# Patient Record
Sex: MALE | Race: WHITE | NOT HISPANIC OR LATINO | Employment: OTHER | URBAN - METROPOLITAN AREA
[De-identification: names, ages, dates, MRNs, and addresses within clinical notes are randomized per-mention and may not be internally consistent; named-entity substitution may affect disease eponyms.]

---

## 2022-11-02 ENCOUNTER — TELEPHONE (OUTPATIENT)
Dept: NEUROLOGY | Facility: CLINIC | Age: 47
End: 2022-11-02

## 2022-11-02 NOTE — TELEPHONE ENCOUNTER
[facility] Call Center    Phone Message    May a detailed message be left on voicemail: yes     Reason for Call: Other: Pt called inquiring on making an appt with Dr. Pedroza.  Writer informed Pt that he needed a referral per new protocols, but states he is a previous Pt of Dr. Pedroza from [facility].    Please call Pt back at [phone] to discuss further.    Action Taken: Message routed to:  [facility]    Travel Screening: Not Applicable

## 2022-11-14 ENCOUNTER — OFFICE VISIT (OUTPATIENT)
Dept: NEUROLOGY | Facility: CLINIC | Age: 47
End: 2022-11-14

## 2022-11-14 ENCOUNTER — LAB (OUTPATIENT)
Dept: LAB | Facility: CLINIC | Age: 47
End: 2022-11-14

## 2022-11-14 VITALS — DIASTOLIC BLOOD PRESSURE: 107 MMHG | HEART RATE: 81 BPM | SYSTOLIC BLOOD PRESSURE: 153 MMHG

## 2022-11-14 DIAGNOSIS — E78.2 MIXED HYPERLIPIDEMIA: ICD-10-CM

## 2022-11-14 DIAGNOSIS — M79.2 NEUROPATHIC PAIN: ICD-10-CM

## 2022-11-14 DIAGNOSIS — M79.2 NEUROPATHIC PAIN: Primary | ICD-10-CM

## 2022-11-14 DIAGNOSIS — R29.818 FINE MOTOR IMPAIRMENT: ICD-10-CM

## 2022-11-14 DIAGNOSIS — R29.898 FINE MOTOR IMPAIRMENT: ICD-10-CM

## 2022-11-14 DIAGNOSIS — F10.288 ALCOHOL DEPENDENCE WITH OTHER ALCOHOL-INDUCED DISORDER (H): ICD-10-CM

## 2022-11-14 DIAGNOSIS — G25.81 RESTLESS LEGS SYNDROME (RLS): ICD-10-CM

## 2022-11-14 LAB
CHOLEST SERPL-MCNC: 135 MG/DL
FASTING STATUS PATIENT QL REPORTED: ABNORMAL
HDLC SERPL-MCNC: 33 MG/DL
LDLC SERPL CALC-MCNC: 83 MG/DL
TRIGL SERPL-MCNC: 97 MG/DL

## 2022-11-14 PROCEDURE — 99215 OFFICE O/P EST HI 40 MIN: CPT | Performed by: PSYCHIATRY & NEUROLOGY

## 2022-11-14 PROCEDURE — 82746 ASSAY OF FOLIC ACID SERUM: CPT

## 2022-11-14 PROCEDURE — 80061 LIPID PANEL: CPT

## 2022-11-14 PROCEDURE — 82607 VITAMIN B-12: CPT

## 2022-11-14 PROCEDURE — 36415 COLL VENOUS BLD VENIPUNCTURE: CPT

## 2022-11-14 RX ORDER — PRAMIPEXOLE DIHYDROCHLORIDE 0.25 MG/1
0.25 TABLET ORAL 2 TIMES DAILY
COMMUNITY
Start: 2022-05-23 | End: 2022-11-14

## 2022-11-14 RX ORDER — PRAMIPEXOLE DIHYDROCHLORIDE 0.25 MG/1
0.25 TABLET ORAL 2 TIMES DAILY
Qty: 180 TABLET | Refills: 3 | Status: SHIPPED | OUTPATIENT
Start: 2022-11-14 | End: 2023-11-13

## 2022-11-14 RX ORDER — ATORVASTATIN CALCIUM 20 MG/1
20 TABLET, FILM COATED ORAL EVERY EVENING
COMMUNITY
Start: 2022-05-23 | End: 2022-11-14

## 2022-11-14 RX ORDER — ATORVASTATIN CALCIUM 20 MG/1
20 TABLET, FILM COATED ORAL EVERY EVENING
Qty: 90 TABLET | Refills: 0 | Status: SHIPPED | OUTPATIENT
Start: 2022-11-14 | End: 2024-04-05

## 2022-11-14 RX ORDER — GABAPENTIN 400 MG/1
CAPSULE ORAL
Qty: 360 CAPSULE | Refills: 3 | Status: SHIPPED | OUTPATIENT
Start: 2022-11-14 | End: 2023-11-13

## 2022-11-14 RX ORDER — GABAPENTIN 400 MG/1
1 CAPSULE ORAL 3 TIMES DAILY
COMMUNITY
Start: 2021-11-19 | End: 2022-11-14

## 2022-11-14 NOTE — PROGRESS NOTES
Date of Service: 11/14/2022    [facility] Neurology   MS Clinic Visit    Subjective: 47-year-old man with a history of alcohol dependence, hyperlipidemia, who presents in follow-up for Guillain-Barré syndrome.  He also has restless leg syndrome.    He is accompanied by his wife, Kaycee, but provides history independently.    I last visited him a little over 1 year ago.  At that time he was noticing gradual improvement of symptoms.    Today he reports that he continues to slowly improve, but is concerned as improvement has been incomplete.  He notes that he has met number of people who have struggled with Guillain-Barré.  Their recovery stories have each been different.    He does notice some improvement in fine motor skills.  He is able to do a clasp for his dog leash, but has difficulty with other tasks such as buttoning a shirt.  His writing skills have improved.  With repetitive use of the hands he will experience a stiff, swollen sensation in the hands.  He does not notice any impact of gabapentin on the sensation.  If he does miss a dose of gabapentin he will start to experience sharp pains in the feet.    His gait continues to gradually improve.  He does have some difficulty when going up stairs such that he needs to hold onto the railing.  His gait is predominantly limited by right knee pain for which a total knee arthroplasty has been advised.  He has not had any falls in the past several months.  He was recently able to walk for 1-1/2 hours carrying a 30 pound pack on his back.  He is able to stand and watch a hockey game for at least 1 hour.    He has not been taking folic acid routinely as he ran out of the medication a few months ago.    He does continue to consume alcohol on a routine basis.  He has anywhere from 6-9 shots of vodka per day.  He has cut out soda consumption.  He notices weight gain in the past 2 to 3 months.    No Known Allergies    Current Outpatient Medications   Medication      atorvastatin (LIPITOR) 20 MG tablet     gabapentin (NEURONTIN) 400 MG capsule     pramipexole (MIRAPEX) 0.25 MG tablet     No current facility-administered medications for this visit.        Past medical, surgical, social and family history was personally reviewed. Pertinent details noted above.     Physical Examination:   BP (!) 153/107 (BP Location: Right arm, Patient Position: Sitting)   Pulse 81     General: no acute distress  Cranial nerves:   VFFC  PER b/l  EOM full w/no NATE   Face symmetric  Hearing intact  No dysarthria   Motor:   Tone is normal   Bulk is normal   Dexterity reduced in the left hand    R L  Finger ext 5 5  Finger abd 5 5    Hip flexion 5 5  Knee flexion 5 5  Knee ext 5 5  Ankle d/f 5 5    Reflexes: 1+ and symmetric throughout, babinski absent bilaterally  Sensory: vibration is moderately reduced in the toes, mildly reduced in the ankles, JPS moderately reduced in the right toe, normal on the left   Romberg is absent  Coordination: no ataxia or dysmetria  Gait: normal base and stride, tandem gait is mildly impaired, able to balance on one foot for 5-7 seconds    Tests/Imaging:   MRI brain   5/2021 - left frontal operculum stroke    Assessment: 47-year-old man with a history of alcohol dependence who presented with subacute gait instability and appendicular ataxia.  He was ultimately diagnosed with Guillain-Barré.  He has experienced some improvement in function, but does have some residual fine motor impairment.    We discussed how incomplete recovery is likely secondary to ongoing alcohol use.  It was emphasized multiple times that alcohol is a toxin and does interfere with nerve recovery.    We did discuss consequences of ongoing alcohol use including impaired short-term memory, impaired balance, and neuropathic pain.  He was encouraged to discontinue alcohol use altogether, though it was also noted that the American Heart Association recommends no more than 2 alcoholic beverages for men  per day.  This would equal two ounces of hard liquor.    For secondary stroke prevention atorvastatin will be renewed, though he was encouraged to follow-up with primary care.  Blood pressure will be reassessed, though we did discuss how this elevation in blood pressure is likely related to ongoing alcohol use.    Plan:   -Occupational therapy for fine motor skills  - Blood work today to assess for vitamin deficiency and cholesterol  - Gabapentin 400 mg 3 times per day, additional dose daily as needed for activities  - Mirapex 0.25 mg twice per day for restless legs  - Follow-up in 1 year    Note was completed with the assistance of Dragon Fluency software which can often result in accidental word substitutions.     A total of 40 minutes on the date of service were spent in the care of this patient.   Lori Pedroza MD on 11/14/2022 at 2:32 PM

## 2022-11-14 NOTE — LETTER
11/14/2022         RE: Prashant Rucker  [address], [place] [address]        Dear Colleague,    Thank you for referring your patient, Prashant Rucker, to the [facility]. Please see a copy of my visit note below.    Date of Service: 11/14/2022    [facility] Neurology   MS Clinic Visit    Subjective: 47-year-old man with a history of alcohol dependence, hyperlipidemia, who presents in follow-up for Guillain-Barré syndrome.  He also has restless leg syndrome.    He is accompanied by his wife, Kaycee, but provides history independently.    I last visited him a little over 1 year ago.  At that time he was noticing gradual improvement of symptoms.    Today he reports that he continues to slowly improve, but is concerned as improvement has been incomplete.  He notes that he has met number of people who have struggled with Guillain-Barré.  Their recovery stories have each been different.    He does notice some improvement in fine motor skills.  He is able to do a clasp for his dog leash, but has difficulty with other tasks such as buttoning a shirt.  His writing skills have improved.  With repetitive use of the hands he will experience a stiff, swollen sensation in the hands.  He does not notice any impact of gabapentin on the sensation.  If he does miss a dose of gabapentin he will start to experience sharp pains in the feet.    His gait continues to gradually improve.  He does have some difficulty when going up stairs such that he needs to hold onto the railing.  His gait is predominantly limited by right knee pain for which a total knee arthroplasty has been advised.  He has not had any falls in the past several months.  He was recently able to walk for 1-1/2 hours carrying a 30 pound pack on his back.  He is able to stand and watch a hockey game for at least 1 hour.    He has not been taking folic acid routinely as he ran out of the medication a few months ago.    He does  continue to consume alcohol on a routine basis.  He has anywhere from 6-9 shots of vodka per day.  He has cut out soda consumption.  He notices weight gain in the past 2 to 3 months.    No Known Allergies    Current Outpatient Medications   Medication     atorvastatin (LIPITOR) 20 MG tablet     gabapentin (NEURONTIN) 400 MG capsule     pramipexole (MIRAPEX) 0.25 MG tablet     No current facility-administered medications for this visit.        Past medical, surgical, social and family history was personally reviewed. Pertinent details noted above.     Physical Examination:   BP (!) 153/107 (BP Location: Right arm, Patient Position: Sitting)   Pulse 81     General: no acute distress  Cranial nerves:   VFFC  PER b/l  EOM full w/no NATE   Face symmetric  Hearing intact  No dysarthria   Motor:   Tone is normal   Bulk is normal   Dexterity reduced in the left hand    R L  Finger ext 5 5  Finger abd 5 5    Hip flexion 5 5  Knee flexion 5 5  Knee ext 5 5  Ankle d/f 5 5    Reflexes: 1+ and symmetric throughout, babinski absent bilaterally  Sensory: vibration is moderately reduced in the toes, mildly reduced in the ankles, JPS moderately reduced in the right toe, normal on the left   Romberg is absent  Coordination: no ataxia or dysmetria  Gait: normal base and stride, tandem gait is mildly impaired, able to balance on one foot for 5-7 seconds    Tests/Imaging:   MRI brain   5/2021 - left frontal operculum stroke    Assessment: 47-year-old man with a history of alcohol dependence who presented with subacute gait instability and appendicular ataxia.  He was ultimately diagnosed with Guillain-Barré.  He has experienced some improvement in function, but does have some residual fine motor impairment.    We discussed how incomplete recovery is likely secondary to ongoing alcohol use.  It was emphasized multiple times that alcohol is a toxin and does interfere with nerve recovery.    We did discuss consequences of ongoing alcohol  use including impaired short-term memory, impaired balance, and neuropathic pain.  He was encouraged to discontinue alcohol use altogether, though it was also noted that the American Heart Association recommends no more than 2 alcoholic beverages for men per day.  This would equal two ounces of hard liquor.    For secondary stroke prevention atorvastatin will be renewed, though he was encouraged to follow-up with primary care.  Blood pressure will be reassessed, though we did discuss how this elevation in blood pressure is likely related to ongoing alcohol use.    Plan:   -Occupational therapy for fine motor skills  - Blood work today to assess for vitamin deficiency and cholesterol  - Gabapentin 400 mg 3 times per day, additional dose daily as needed for activities  - Mirapex 0.25 mg twice per day for restless legs  - Follow-up in 1 year    Note was completed with the assistance of Dragon Fluency software which can often result in accidental word substitutions.     A total of 40 minutes on the date of service were spent in the care of this patient.   Lori Pedroza MD on 11/14/2022 at 2:32 PM          Again, thank you for allowing me to participate in the care of your patient.        Sincerely,        Lori Pedroza MD

## 2022-11-14 NOTE — PATIENT INSTRUCTIONS
Blood work today     We will recheck your blood pressure before you go     Alcohol - max 2 oz hard liquor per day, ideal is none  Risk of continuing is worsened memory, balaqnce, and [id number] to control nerve pain     Hand discomfort - can try taking 1 extra gabapentin daily as needed 30-60 minutes before increased activity     Visit with OT for fine motor skills    Follow up in 1 year

## 2022-11-14 NOTE — NURSING NOTE
Chief Complaint   Patient presents with     MS     Previous pt at        JERICHO Guevara on 11/14/2022 at 1:36 PM

## 2022-11-15 LAB
FOLATE SERPL-MCNC: 3.2 NG/ML (ref 4.6–34.8)
VIT B12 SERPL-MCNC: 339 PG/ML (ref 232–1245)

## 2022-11-16 ENCOUNTER — TELEPHONE (OUTPATIENT)
Dept: NEUROLOGY | Facility: CLINIC | Age: 47
End: 2022-11-16

## 2022-11-16 RX ORDER — FOLIC ACID 1 MG/1
1 TABLET ORAL DAILY
Qty: 90 TABLET | Refills: 3 | Status: SHIPPED | OUTPATIENT
Start: 2022-11-16

## 2022-11-16 NOTE — TELEPHONE ENCOUNTER
[facility] Call Center    Phone Message    May a detailed message be left on voicemail: no     Reason for Call: Other: Patient called back due to a missed call. Writer did inform of the results in this string and that a prescription has been called to his pharmacy.     Action Taken: Message routed to:  Other: [facility] NEUROLOGY    Travel Screening: Not Applicable

## 2022-11-16 NOTE — RESULT ENCOUNTER NOTE
Please notify patient that blood work reveals his cholesterol is controlled with the stating. B12 level is normal. Folate level is low. He needs to take a supplement again. Folate is necessary for nerve health. Lori Pedroza MD on 11/16/2022 at 8:22 AM

## 2022-11-16 NOTE — TELEPHONE ENCOUNTER
----- Message from Lori Pedroza MD sent at 11/16/2022  8:22 AM CST -----  Please notify patient that blood work reveals his cholesterol is controlled with the stating. B12 level is normal. Folate level is low. He needs to take a supplement again. Folate is necessary for nerve health. Lori Pedroza MD on 11/16/202 2 at 8:22 AM

## 2023-11-13 ENCOUNTER — LAB (OUTPATIENT)
Dept: LAB | Facility: CLINIC | Age: 48
End: 2023-11-13

## 2023-11-13 ENCOUNTER — OFFICE VISIT (OUTPATIENT)
Dept: NEUROLOGY | Facility: CLINIC | Age: 48
End: 2023-11-13

## 2023-11-13 VITALS — DIASTOLIC BLOOD PRESSURE: 96 MMHG | SYSTOLIC BLOOD PRESSURE: 140 MMHG | HEART RATE: 98 BPM

## 2023-11-13 DIAGNOSIS — E53.8 FOLATE DEFICIENCY: ICD-10-CM

## 2023-11-13 DIAGNOSIS — R29.898 FINE MOTOR IMPAIRMENT: ICD-10-CM

## 2023-11-13 DIAGNOSIS — I10 PRIMARY HYPERTENSION: ICD-10-CM

## 2023-11-13 DIAGNOSIS — M79.2 NEUROPATHIC PAIN: ICD-10-CM

## 2023-11-13 DIAGNOSIS — M79.2 NEUROPATHIC PAIN: Primary | ICD-10-CM

## 2023-11-13 DIAGNOSIS — R29.818 FINE MOTOR IMPAIRMENT: ICD-10-CM

## 2023-11-13 DIAGNOSIS — G25.81 RESTLESS LEGS SYNDROME (RLS): ICD-10-CM

## 2023-11-13 LAB — FOLATE SERPL-MCNC: 23.2 NG/ML (ref 4.6–34.8)

## 2023-11-13 PROCEDURE — 36415 COLL VENOUS BLD VENIPUNCTURE: CPT

## 2023-11-13 PROCEDURE — 82746 ASSAY OF FOLIC ACID SERUM: CPT

## 2023-11-13 PROCEDURE — 99214 OFFICE O/P EST MOD 30 MIN: CPT | Performed by: PSYCHIATRY & NEUROLOGY

## 2023-11-13 PROCEDURE — 82607 VITAMIN B-12: CPT

## 2023-11-13 RX ORDER — IBUPROFEN 200 MG
400 TABLET ORAL
COMMUNITY
Start: 2021-06-30

## 2023-11-13 RX ORDER — GABAPENTIN 400 MG/1
CAPSULE ORAL
Qty: 360 CAPSULE | Refills: 3 | Status: SHIPPED | OUTPATIENT
Start: 2023-11-13

## 2023-11-13 RX ORDER — PRAMIPEXOLE DIHYDROCHLORIDE 0.25 MG/1
0.25 TABLET ORAL 2 TIMES DAILY
Qty: 180 TABLET | Refills: 3 | Status: SHIPPED | OUTPATIENT
Start: 2023-11-13

## 2023-11-13 RX ORDER — METOPROLOL TARTRATE 25 MG/1
25 TABLET, FILM COATED ORAL 2 TIMES DAILY
Qty: 60 TABLET | Refills: 2 | Status: SHIPPED | OUTPATIENT
Start: 2023-11-13 | End: 2024-04-05

## 2023-11-13 NOTE — LETTER
11/13/2023         RE: Prashant uRcker  [address] [address]        Dear Colleague,    Thank you for referring your patient, Prashant Rucker, to the [facility]OLOG[facility]. Please see a copy of my visit note below.    Date of Service: 11/13/2023    [facility] Neurology   MS Clinic Visit    Subjective: 48-year-old man with a history of alcohol dependence, hyperlipidemia, who presents in follow-up for Guillain-Barré syndrome.  He also has restless leg syndrome.    He is accompanied by his wife, Kaycee, but provides history independently.  He does not report any significant decline since his last visit with me.    He is happy to report that he was able to play golf over the summer months.  If he played golf multiple days in a row he would start to notice some fatigue, but otherwise he was able to tolerate the activity fairly well.  Just the past couple days he was able to do some walking in the woods as he is preparing for hunting.  Balance is improved.  He has not had any falls in the past year.    He does have some residual numbness in his fingertips.  With this he notices some impairment of fine motor control, but does note that he is able to do some things better than he could in the past.    He continues to take gabapentin.  He did try going without this for a day, but did experience significant symptoms as he tried to fall asleep.  He continues to take 400 mg 3 times per day and will take 1 extra tablet on days where he is more physically active.    He does report that his blood pressure is elevated today.  He is interested in resuming an antihypertensive medication that he has taken in the past.    He is taking his folic acid supplements.    Pramipexole continues to adequately suppress restless legs    No Known Allergies    Current Outpatient Medications   Medication     aspirin (ASA) 81 MG EC tablet     folic acid (FOLVITE) 1 MG tablet     gabapentin (NEURONTIN)  400 MG capsule     ibuprofen (ADVIL/MOTRIN) 200 MG tablet     pramipexole (MIRAPEX) 0.25 MG tablet     atorvastatin (LIPITOR) 20 MG tablet     No current facility-administered medications for this visit.        Past medical, surgical, social and family history was personally reviewed. Pertinent details noted above.     Physical Examination:   BP (!) 140/96 (BP Location: Right arm, Patient Position: Sitting)   Pulse 98     General: no acute distress  Cranial nerves:   VFFC  PER b/l  EOM full w/no NATE   Face symmetric  Hearing intact  No dysarthria   Motor:   Tone is normal   Bulk is normal   Dexterity reduced in the left hand    R L  Deltoid  5 5  Biceps  5 5  Triceps  5 5  Finger ext 5 5  Finger abd 5 5    Hip flexion 5 5-  Knee flexion 5 5  Knee ext 5 5  Ankle d/f 5 5    Reflexes: 1+ and symmetric throughout, no ankle clonus  Sensory: vibration is normal in the ankles  Romberg is absent  Coordination: no ataxia or dysmetria  Gait: normal base and stride, tandem gait is mildly impaired, able to balance on right foot for 5-7 seconds, struggles with the left foot     Tests/Imaging:   Folate 3.2  B12 300s    MRI brain   5/2021 - left frontal operculum stroke    Assessment: 48-year-old man with a history of alcohol dependence who presented with subacute gait instability and appendicular ataxia.  He was ultimately diagnosed with Guillain-Barré.  Examination today is remarkable for an improvement in sensation in the lower extremities, though the left side is noted to be slightly more impaired compared to the right.    We discussed adjustments that could be made for his gabapentin.    We will continue with Mirapex for management of restless legs.    I would like to check his folic acid and B12 levels to ensure adequate replacement.    I did order for him to resume taking metoprolol, though strongly encouraged him to initiate care with a new primary care provider.    Plan:   -Gabapentin 400 mg in the morning, 800 mg at  bedtime, daily dose as needed  - Mirapex 0.25 mg twice per day  - Folic acid and B12 levels today  - Metoprolol 5 provided for 3 months  - Follow-up in 1 year/as needed    Note was completed with the assistance of Dragon Fluency software which can often result in accidental word substitutions.     A total of 30 minutes on the date of service were spent in the care of this patient.   Lori Pedroza MD on 11/14/2022 at 2:32 PM        Again, thank you for allowing me to participate in the care of your patient.        Sincerely,        Lori Pedroza MD

## 2023-11-13 NOTE — PATIENT INSTRUCTIONS
Gabapentin   Try 1 in the morning and two at night (you can take at dinner time so that the level is up by the time you go to bed)   Take 1 daily as needed  Can reduce by 1 tablet per day each week if you would like     Continue pramipexole     Metoprolol ordered for you to take until you see primary care     [facility] into [facility]     Blood work today     Follow up in 1 year

## 2023-11-13 NOTE — NURSING NOTE
Chief Complaint   Patient presents with    Follow Up       JERICHO Guevara on 11/13/2023 at 1:47 PM

## 2023-11-13 NOTE — PROGRESS NOTES
Date of Service: 11/13/2023    [facility] Neurology   MS Clinic Visit    Subjective: 48-year-old man with a history of alcohol dependence, hyperlipidemia, who presents in follow-up for Guillain-Barré syndrome.  He also has restless leg syndrome.    He is accompanied by his wife, Kaycee, but provides history independently.  He does not report any significant decline since his last visit with me.    He is happy to report that he was able to play golf over the summer months.  If he played golf multiple days in a row he would start to notice some fatigue, but otherwise he was able to tolerate the activity fairly well.  Just the past couple days he was able to do some walking in the woods as he is preparing for hunting.  Balance is improved.  He has not had any falls in the past year.    He does have some residual numbness in his fingertips.  With this he notices some impairment of fine motor control, but does note that he is able to do some things better than he could in the past.    He continues to take gabapentin.  He did try going without this for a day, but did experience significant symptoms as he tried to fall asleep.  He continues to take 400 mg 3 times per day and will take 1 extra tablet on days where he is more physically active.    He does report that his blood pressure is elevated today.  He is interested in resuming an antihypertensive medication that he has taken in the past.    He is taking his folic acid supplements.    Pramipexole continues to adequately suppress restless legs    No Known Allergies    Current Outpatient Medications   Medication    aspirin (ASA) 81 MG EC tablet    folic acid (FOLVITE) 1 MG tablet    gabapentin (NEURONTIN) 400 MG capsule    ibuprofen (ADVIL/MOTRIN) 200 MG tablet    pramipexole (MIRAPEX) 0.25 MG tablet    atorvastatin (LIPITOR) 20 MG tablet     No current facility-administered medications for this visit.        Past medical, surgical, social and family history was personally  reviewed. Pertinent details noted above.     Physical Examination:   BP (!) 140/96 (BP Location: Right arm, Patient Position: Sitting)   Pulse 98     General: no acute distress  Cranial nerves:   VFFC  PER b/l  EOM full w/no NATE   Face symmetric  Hearing intact  No dysarthria   Motor:   Tone is normal   Bulk is normal   Dexterity reduced in the left hand    R L  Deltoid  5 5  Biceps  5 5  Triceps  5 5  Finger ext 5 5  Finger abd 5 5    Hip flexion 5 5-  Knee flexion 5 5  Knee ext 5 5  Ankle d/f 5 5    Reflexes: 1+ and symmetric throughout, no ankle clonus  Sensory: vibration is normal in the ankles  Romberg is absent  Coordination: no ataxia or dysmetria  Gait: normal base and stride, tandem gait is mildly impaired, able to balance on right foot for 5-7 seconds, struggles with the left foot     Tests/Imaging:   Folate 3.2  B12 300s    MRI brain   5/2021 - left frontal operculum stroke    Assessment: 48-year-old man with a history of alcohol dependence who presented with subacute gait instability and appendicular ataxia.  He was ultimately diagnosed with Guillain-Barré.  Examination today is remarkable for an improvement in sensation in the lower extremities, though the left side is noted to be slightly more impaired compared to the right.    We discussed adjustments that could be made for his gabapentin.    We will continue with Mirapex for management of restless legs.    I would like to check his folic acid and B12 levels to ensure adequate replacement.    I did order for him to resume taking metoprolol, though strongly encouraged him to initiate care with a new primary care provider.    Plan:   -Gabapentin 400 mg in the morning, 800 mg at bedtime, daily dose as needed  - Mirapex 0.25 mg twice per day  - Folic acid and B12 levels today  - Metoprolol 5 provided for 3 months  - Follow-up in 1 year/as needed    Note was completed with the assistance of Dragon Fluency software which can often result in accidental word  substitutions.     A total of 30 minutes on the date of service were spent in the care of this patient.   Lori Pedroza MD on 11/14/2022 at 2:32 PM

## 2023-11-14 LAB — VIT B12 SERPL-MCNC: 384 PG/ML (ref 232–1245)

## 2023-11-14 NOTE — RESULT ENCOUNTER NOTE
Please notify pt that b12 and folate level are normal. He should continue his supplement (folate) Lori Pedroza MD on 11/14/2023 at 10:30 AM

## 2023-11-15 ENCOUNTER — TELEPHONE (OUTPATIENT)
Dept: NEUROLOGY | Facility: CLINIC | Age: 48
End: 2023-11-15

## 2023-11-15 NOTE — TELEPHONE ENCOUNTER
----- Message from Aure Youssef RN sent at 11/15/2023  9:56 AM CST -----    ----- Message -----  From: Lori Pedroza MD  Sent: 11/14/2023  10:31 AM CST  To: Multiple Sclerosis Nursing Pool    Please notify pt that b12 and folate level are normal. He should continue his supplement (folate) Lori Pedroza MD on 11/14/2023 at 10:30 AM

## 2023-11-15 NOTE — TELEPHONE ENCOUNTER
Called and left a detailed message regarding message below (consent to leave detailed msg in chart).    JERICHO Guevara on 11/15/2023 at 12:51 PM

## 2024-04-04 ENCOUNTER — TELEPHONE (OUTPATIENT)
Dept: NEUROLOGY | Facility: CLINIC | Age: 49
End: 2024-04-04

## 2024-04-04 ENCOUNTER — OFFICE VISIT (OUTPATIENT)
Dept: NEUROLOGY | Facility: CLINIC | Age: 49
End: 2024-04-04

## 2024-04-04 ENCOUNTER — LAB (OUTPATIENT)
Dept: LAB | Facility: CLINIC | Age: 49
End: 2024-04-04

## 2024-04-04 VITALS — DIASTOLIC BLOOD PRESSURE: 88 MMHG | SYSTOLIC BLOOD PRESSURE: 140 MMHG | HEART RATE: 90 BPM

## 2024-04-04 DIAGNOSIS — G60.8 ACUTE SENSORY NEUROPATHY: ICD-10-CM

## 2024-04-04 DIAGNOSIS — R11.0 NAUSEA: ICD-10-CM

## 2024-04-04 DIAGNOSIS — G60.8 ACUTE SENSORY NEUROPATHY: Primary | ICD-10-CM

## 2024-04-04 LAB
ALBUMIN SERPL BCG-MCNC: 3.7 G/DL (ref 3.5–5.2)
ALP SERPL-CCNC: 189 U/L (ref 40–150)
ALT SERPL W P-5'-P-CCNC: 22 U/L (ref 0–70)
ANION GAP SERPL CALCULATED.3IONS-SCNC: 12 MMOL/L (ref 7–15)
AST SERPL W P-5'-P-CCNC: 82 U/L (ref 0–45)
BASOPHILS # BLD AUTO: 0 10E3/UL (ref 0–0.2)
BASOPHILS NFR BLD AUTO: 0 %
BILIRUB SERPL-MCNC: 3.9 MG/DL
BUN SERPL-MCNC: 5.8 MG/DL (ref 6–20)
CALCIUM SERPL-MCNC: 9.2 MG/DL (ref 8.6–10)
CHLORIDE SERPL-SCNC: 87 MMOL/L (ref 98–107)
CREAT SERPL-MCNC: 0.59 MG/DL (ref 0.67–1.17)
DEPRECATED HCO3 PLAS-SCNC: 32 MMOL/L (ref 22–29)
EGFRCR SERPLBLD CKD-EPI 2021: >90 ML/MIN/1.73M2
EOSINOPHIL # BLD AUTO: 0.1 10E3/UL (ref 0–0.7)
EOSINOPHIL NFR BLD AUTO: 1 %
ERYTHROCYTE [DISTWIDTH] IN BLOOD BY AUTOMATED COUNT: 15.3 % (ref 10–15)
FOLATE SERPL-MCNC: 3.5 NG/ML (ref 4.6–34.8)
GLUCOSE SERPL-MCNC: 106 MG/DL (ref 70–99)
HCT VFR BLD AUTO: 42.7 % (ref 40–53)
HGB BLD-MCNC: 15.5 G/DL (ref 13.3–17.7)
IMM GRANULOCYTES # BLD: 0 10E3/UL
IMM GRANULOCYTES NFR BLD: 0 %
LYMPHOCYTES # BLD AUTO: 1.9 10E3/UL (ref 0.8–5.3)
LYMPHOCYTES NFR BLD AUTO: 19 %
MCH RBC QN AUTO: 33.8 PG (ref 26.5–33)
MCHC RBC AUTO-ENTMCNC: 36.3 G/DL (ref 31.5–36.5)
MCV RBC AUTO: 93 FL (ref 78–100)
MONOCYTES # BLD AUTO: 0.7 10E3/UL (ref 0–1.3)
MONOCYTES NFR BLD AUTO: 7 %
NEUTROPHILS # BLD AUTO: 6.9 10E3/UL (ref 1.6–8.3)
NEUTROPHILS NFR BLD AUTO: 72 %
NRBC # BLD AUTO: 0 10E3/UL
NRBC BLD AUTO-RTO: 0 /100
PLATELET # BLD AUTO: 149 10E3/UL (ref 150–450)
POTASSIUM SERPL-SCNC: 2.8 MMOL/L (ref 3.4–5.3)
PROT SERPL-MCNC: 7.8 G/DL (ref 6.4–8.3)
RBC # BLD AUTO: 4.59 10E6/UL (ref 4.4–5.9)
SODIUM SERPL-SCNC: 131 MMOL/L (ref 135–145)
WBC # BLD AUTO: 9.6 10E3/UL (ref 4–11)

## 2024-04-04 PROCEDURE — 82784 ASSAY IGA/IGD/IGG/IGM EACH: CPT

## 2024-04-04 PROCEDURE — 82746 ASSAY OF FOLIC ACID SERUM: CPT

## 2024-04-04 PROCEDURE — 83516 IMMUNOASSAY NONANTIBODY: CPT

## 2024-04-04 PROCEDURE — 80053 COMPREHEN METABOLIC PANEL: CPT

## 2024-04-04 PROCEDURE — 99214 OFFICE O/P EST MOD 30 MIN: CPT | Performed by: PSYCHIATRY & NEUROLOGY

## 2024-04-04 PROCEDURE — 82607 VITAMIN B-12: CPT

## 2024-04-04 PROCEDURE — 36415 COLL VENOUS BLD VENIPUNCTURE: CPT

## 2024-04-04 PROCEDURE — 85048 AUTOMATED LEUKOCYTE COUNT: CPT

## 2024-04-04 RX ORDER — PROCHLORPERAZINE MALEATE 10 MG
10 TABLET ORAL EVERY 6 HOURS PRN
Qty: 30 TABLET | Refills: 1 | Status: SHIPPED | OUTPATIENT
Start: 2024-04-04

## 2024-04-04 NOTE — LETTER
4/4/2024         RE: Prashant Rucker  [address] [facility] [address]        Dear Colleague,    Thank you for referring your patient, Prashant Rucker, to the [facility] NEUROLOGY [facility]IN[facility]. Please see a copy of my visit note below.    Date of Service: 4/4/2024    [facility] Neurology   MS Clinic Visit    Subjective: 48-year-old man with a history of alcohol dependence, hyperlipidemia, who presents in follow-up for Guillain-Barré syndrome.  He also has restless leg syndrome.    He is accompanied by his wife but provides history dependently.    He recalls that he was feeling quite well up until a few weeks ago.  He then started to experience some mild nausea.  This got worse a couple weeks ago, but in the past couple days it has even worsened.  He now has difficulty tolerating any solids.  He can tolerate liquids okay.    In the past 4 days he has noticed an acute decline in balance.  He feels lightheaded if he gets up too fast.  His head feels as though it is floating.  His vision is slightly blurry/she has difficulty focusing.  He generally feels weak.  He is dropping things from his hands and feels as though the webs between his fingers is entirely numb.  He has difficulty holding onto a pen or pencil.    He denies any pain.    He denies any bladder dysfunction, though does comment that when he urinates his urine is really dark.  He has been trying to hydrate adequately.    Recall that he does have a history of significant alcohol use.  He will not quantify the amount that he was drinking, but acknowledges that it was quite significant.  He did cut back to 2 drinks per day approximately 3 weeks ago because of changes in his environment/he was spending time with people who did not drink as much.    No Known Allergies    Current Outpatient Medications   Medication Sig Dispense Refill     aspirin (ASA) 81 MG EC tablet Take 81 mg by mouth daily       folic acid (FOLVITE) 1 MG tablet Take  1 tablet (1 mg) by mouth daily 90 tablet 3     gabapentin (NEURONTIN) 400 MG capsule Take 1 capsule (400 mg) by mouth 3 times daily. May also take 1 capsule (400 mg) daily as needed for neuropathic pain. 360 capsule 3     ibuprofen (ADVIL/MOTRIN) 200 MG tablet Take 400 mg by mouth nightly as needed       metoprolol tartrate (LOPRESSOR) 25 MG tablet Take 1 tablet (25 mg) by mouth 2 times daily 60 tablet 2     atorvastatin (LIPITOR) 20 MG tablet Take 1 tablet (20 mg) by mouth every evening (Patient not taking: Reported on 11/13/2023) 90 tablet 0     pramipexole (MIRAPEX) 0.25 MG tablet Take 1 tablet (0.25 mg) by mouth 2 times daily (Patient not taking: Reported on 4/4/2024) 180 tablet 3     No current facility-administered medications for this visit.        Past medical, surgical, social and family history was personally reviewed. Pertinent details noted above.     Physical Examination:   BP (!) 140/88 (BP Location: Right arm, Patient Position: Sitting, Cuff Size: Adult Regular)   Pulse 90     General: no acute distress  Cranial nerves:   VFFC  PER b/l  EOM full w/no NATE   Face symmetric  Hearing intact  No dysarthria   Motor:   Tone is normal   Bulk is normal     R L  Deltoid  5 5  Biceps  5 5  Triceps  5 5  Finger ext 4+ 4+  Finger abd 4+ 4+    Hip flexion 5 5-  Knee flexion 5 5  Knee ext 5 5  Ankle d/f 5 5    Reflexes: trace right biceps, otherwise absent, babinski absent, no ankle clonus  Sensory: vibration is moderately reduced in the toes, mildly reduced in the finger tips, JPS is absent in the toes, mild/mod impaired in the fingertips  Romberg is present  Coordination: sensory ataxia of UE  Gait: ataxic gait    Tests/Imaging:   Folate 3.2 -> 23.2  B12 300s-> 384    MRI brain   5/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    His first event occurred in 2021.  He was noted to  have positive anti-GD1B antibodies.  He was treated with IVIG and did get notable improvement.    It seems that he is having a relapse of this condition.  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    Because of the dark urine and the nausea I have recommended ruling out porphyria.    Plan:   -Blood work, urine porphyrins and porphobilinogen  - EMG, urgent  - IVIG, MTM referral  - Follow-up in 4 weeks    Note was completed with the assistance of Dragon Fluency software which can often result in accidental word substitutions.     A total of 30 minutes on the date of service were spent in the care of this patient.   Lori Pedroza MD on 4/4/2024 at 12:24 PM          Again, thank you for allowing me to participate in the care of your patient.        Sincerely,        Lori Pedroza MD

## 2024-04-04 NOTE — TELEPHONE ENCOUNTER
Called and spoke with patient, and advised him to present to an ER (as directed by provider related to potassium level in lab work today, and need for potassium replacement).    Patient states understanding and conveys to writer that they will immediately go to the ER as directed (see previous provider note).    Rajat Almonte RN, BSN  [facility] Neurology

## 2024-04-04 NOTE — PROGRESS NOTES
Date of Service: 4/4/2024    [facility] Neurology   MS Clinic Visit    Subjective: 48-year-old man with a history of alcohol dependence, hyperlipidemia, who presents in follow-up for Guillain-Barré syndrome.  He also has restless leg syndrome.    He is accompanied by his wife but provides history dependently.    He recalls that he was feeling quite well up until a few weeks ago.  He then started to experience some mild nausea.  This got worse a couple weeks ago, but in the past couple days it has even worsened.  He now has difficulty tolerating any solids.  He can tolerate liquids okay.    In the past 4 days he has noticed an acute decline in balance.  He feels lightheaded if he gets up too fast.  His head feels as though it is floating.  His vision is slightly blurry/she has difficulty focusing.  He generally feels weak.  He is dropping things from his hands and feels as though the webs between his fingers is entirely numb.  He has difficulty holding onto a pen or pencil.    He denies any pain.    He denies any bladder dysfunction, though does comment that when he urinates his urine is really dark.  He has been trying to hydrate adequately.    Recall that he does have a history of significant alcohol use.  He will not quantify the amount that he was drinking, but acknowledges that it was quite significant.  He did cut back to 2 drinks per day approximately 3 weeks ago because of changes in his environment/he was spending time with people who did not drink as much.    No Known Allergies    Current Outpatient Medications   Medication Sig Dispense Refill    aspirin (ASA) 81 MG EC tablet Take 81 mg by mouth daily      folic acid (FOLVITE) 1 MG tablet Take 1 tablet (1 mg) by mouth daily 90 tablet 3    gabapentin (NEURONTIN) 400 MG capsule Take 1 capsule (400 mg) by mouth 3 times daily. May also take 1 capsule (400 mg) daily as needed for neuropathic pain. 360 capsule 3    ibuprofen (ADVIL/MOTRIN) 200 MG tablet Take 400 mg  by mouth nightly as needed      metoprolol tartrate (LOPRESSOR) 25 MG tablet Take 1 tablet (25 mg) by mouth 2 times daily 60 tablet 2    atorvastatin (LIPITOR) 20 MG tablet Take 1 tablet (20 mg) by mouth every evening (Patient not taking: Reported on 11/13/2023) 90 tablet 0    pramipexole (MIRAPEX) 0.25 MG tablet Take 1 tablet (0.25 mg) by mouth 2 times daily (Patient not taking: Reported on 4/4/2024) 180 tablet 3     No current facility-administered medications for this visit.        Past medical, surgical, social and family history was personally reviewed. Pertinent details noted above.     Physical Examination:   BP (!) 140/88 (BP Location: Right arm, Patient Position: Sitting, Cuff Size: Adult Regular)   Pulse 90     General: no acute distress  Cranial nerves:   VFFC  PER b/l  EOM full w/no NATE   Face symmetric  Hearing intact  No dysarthria   Motor:   Tone is normal   Bulk is normal     R L  Deltoid  5 5  Biceps  5 5  Triceps  5 5  Finger ext 4+ 4+  Finger abd 4+ 4+    Hip flexion 5 5-  Knee flexion 5 5  Knee ext 5 5  Ankle d/f 5 5    Reflexes: trace right biceps, otherwise absent, babinski absent, no ankle clonus  Sensory: vibration is moderately reduced in the toes, mildly reduced in the finger tips, JPS is absent in the toes, mild/mod impaired in the fingertips  Romberg is present  Coordination: sensory ataxia of UE  Gait: ataxic gait    Tests/Imaging:   Folate 3.2 -> 23.2  B12 300s-> 384    MRI brain   5/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    His first event occurred in 2021.  He was noted to have positive anti-GD1B antibodies.  He was treated with IVIG and did get notable improvement.    It seems that he is having a relapse of this condition.  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    Because of the dark  urine and the nausea I have recommended ruling out porphyria.    Plan:   -Blood work, urine porphyrins and porphobilinogen  - EMG, urgent  - IVIG, MTM referral  - Follow-up in 4 weeks    Note was completed with the assistance of Dragon Fluency software which can often result in accidental word substitutions.     A total of 30 minutes on the date of service were spent in the care of this patient.   Lori Pedroza MD on 4/4/2024 at 12:24 PM

## 2024-04-04 NOTE — PATIENT INSTRUCTIONS
Your exam reveals complete loss of proprioception     This is due to an acute neuropathy   - cidp   - acute porphyria    Blood and urine testing today     EMG     Visit with pharmacist to get ivig     Follow up in 4 weeks

## 2024-04-05 ENCOUNTER — TELEPHONE (OUTPATIENT)
Dept: NEUROLOGY | Facility: CLINIC | Age: 49
End: 2024-04-05

## 2024-04-05 ENCOUNTER — VIRTUAL VISIT (OUTPATIENT)
Dept: NEUROLOGY | Facility: CLINIC | Age: 49
End: 2024-04-05
Attending: PSYCHIATRY & NEUROLOGY

## 2024-04-05 DIAGNOSIS — G61.81 CIDP (CHRONIC INFLAMMATORY DEMYELINATING POLYNEUROPATHY) (H): Primary | ICD-10-CM

## 2024-04-05 LAB
IGA SERPL-MCNC: 1019 MG/DL (ref 84–499)
VIT B12 SERPL-MCNC: 876 PG/ML (ref 232–1245)

## 2024-04-05 PROCEDURE — 99207 PR NO BILLABLE SERVICE THIS VISIT: CPT | Mod: 93 | Performed by: PHARMACIST

## 2024-04-05 RX ORDER — ONDANSETRON 4 MG/1
1 TABLET, ORALLY DISINTEGRATING ORAL EVERY 8 HOURS PRN
COMMUNITY
Start: 2024-04-04

## 2024-04-05 RX ORDER — POTASSIUM CHLORIDE 1500 MG/1
20 TABLET, EXTENDED RELEASE ORAL 2 TIMES DAILY
COMMUNITY
Start: 2024-04-04 | End: 2024-04-07

## 2024-04-05 NOTE — PATIENT INSTRUCTIONS
"Recommendations from today's MTM visit:                                                    MTM (medication therapy management) is a service provided by a clinical pharmacist designed to help you get the most of out of your medicines.      Continue the potassium supplement through the weekend and have your potassium rechecked on Monday.  Start taking the new folic acid pills you have- 2 tabs of 200 mcg, twice daily. Okay to take with food if helps lessen GI side effects.   I am looking into patient assistance programs for IVIG. It looks like [employer] and [employer] may have free-drug programs available for people who are uninsured. I will be discussing with Dr. Pedroza about the dosage and frequency of IVIG therapy that she would like you to try.     Follow-up: after discussing with Dr. Pedroza    It was great speaking with you today.  I value your experience and would be very thankful for your time in providing feedback in our clinic survey. In the next few days, you may receive an email or text message from [employer] with a link to a survey related to your  clinical pharmacist.\"     To schedule another MTM appointment, please call the clinic directly or you may call the MTM scheduling line at [phone].    My Clinical Pharmacist's contact information:                                                      Please feel free to contact me with any questions or concerns you have.      Melinda Hoyt, Pharm.D.  Medication Therapy Management Pharmacist  [facility]     "

## 2024-04-05 NOTE — TELEPHONE ENCOUNTER
Patient to start IVIG. I am investigating options for patient assistance programs that may cover the cost of IVIG since he is uninsured. It appears [employer] and [employer] both have PAPs for uninsured patients.    Guillain-Tilly dosing is 400 mg/kg IV once daily x 5 days. CIDP dosing varies considerably. Will verify with Dr. Pedroza which dosing she would like to use for this patient. When he was at [facility] in 2021 he received IVIG 400 mg/kg x 5 days (specific brand unknown).    Dr. Pedroza- please advise which dosing regimen you would like for this patient's IVIG. Also- he developed psychosis after getting IV steroids at [facility] in 2021 so he reported getting IVIG without steroids the remaining 4 days. Are you okay with removing the hydrocortisone from the pre-meds?    Melinda Hoyt, Pharm.D.  Medication Therapy Management Pharmacist  [facility] Neurology

## 2024-04-05 NOTE — Clinical Note
4/5/2024       RE: Prashant Rucker  [address] [address]     Dear Colleague,    Thank you for referring your patient, Prashatn Rucker, to the [facility] MULTIPLE SCLEROSIS CLINIC [place] [facility]. Please see a copy of my visit note below.    Medication Therapy Management (MTM) Encounter    ASSESSMENT:                            Medication Adherence/Access: No issues identified    CIDP:  Patient would benefit from IVIG therapy. I reviewed [facility] records from 2021 and it appears he had received immune globulin (human) infusion 30 g 400 mg/kg (Ideal) Intravenous daily x 5 days.  I could not find the exact brand of IVIG he received but could call [facility]. The biggest issue is going to be cost of IVIG. Some drug manufacturers offer patient assistance programs which may be an option for this patient since his uninsured.     Hyperlipidemia/hypertension:    Due to lack of insurance and not having a current PCP, patient not currently taking blood pressure or cholesterol medication. Other health issues have become a priority at this time.       PLAN:                            Continue the potassium supplement through the weekend and have your potassium rechecked on Monday.  Start taking the new folic acid you have- 2 tabs of 200 mcg, twice daily. Okay to take with food if helps lessen GI side effects.   I will look into IVIG options that may be affordable such as patient assistance programs.     Follow-up: after discussing with Dr. Pedroza    SUBJECTIVE/OBJECTIVE:                          Prashant Rucker is a 48 year old male called for an initial visit. He was referred to me from Dr Pedroza.      Reason for visit: IVIG coordination    Allergies/ADRs: Reviewed in chart  Past Medical History: Reviewed in chart  Tobacco: He reports that he has been smoking cigarettes. He has never used smokeless tobacco.Nicotine/Tobacco Cessation  "Plan  Information offered: Patient not interested at this time - about a pack per day currently   Alcohol: states he has been reducing use over the last month, down to 2-3 drinks/day currently   THC: smokes marijuana occasionally     Medication Adherence/Access: no issues reported- medications are laid out on nightstand, takes meds at 10 am, 4 pm, 10 pm     CIDP:  - gabapentin 400 mg 3 times/day  - folic acid - planning to start taking 400 mcg twice daily   - pramipexole 0.25 mg twice daily - for RLS   - ibuprofen 400 mg daily    Patient has a history of Guillain-[place], was hospitalized at [facility] in 2021. During that hospitalization he received IVIG x 5 days which was very effective but he reports that after the first dose he \"tripped out\". He recalls that the psychosis was attributed to the steroids in the infusion so the steroids were removed for the remaining 4 doses and he tolerated those dose well with no issues. He never had any infusion reactions.     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 He has not found a PCP that he likes yet but plans to find a PCP. He has been following with Dr. Pedroza periodically and states his symptoms of CIDP were stable up until this past week. He was able to golf several rounds of golf last week and as of today is unable to walk. He is going to start using a walker today.     He went to the ER yesterday due to severely low potassium. He received IV potassium in the ER and his potassium went up from 2.8 to 2.9. The ER physician wanted to admit him but he opted to go home and will be taking oral potassium ER 20 mEq twice daily x 3 days and will have his potassium rechecked on Monday. He is also going to be doing a 24 hour urine collection over the weekend. He states he is not currently driving; his wife took away his keys. He will like to start on IVIG but is concerned " about the cost without insurance. He expects it to cost $10,000 per treatment.     He has anti-nausea meds to take at home. He is planning to restart folic acid. He had stopped taking it due to upset stomach. His friend gave him a bottle of folic acid 200 mcg tabs and he is going to try taking 400 mcg twice daily.     Hyperlipidemia/hypertension:    - atorvastatin- not currently taking as doesn't have a PCP  - metoprolol- not currently taking as doesn't have a PCP   - aspirin 81 mg daily   Patient reports no current medication side effects.    Today's Vitals: There were no vitals taken for this visit.  ----------------    I spent 22 minutes with this patient today. I offer these suggestions for consideration by Dr. Pedroza. A copy of the visit note was provided to the patient's provider(s).    A summary of these recommendations was sent via [employer].    Melinda Hoyt, Pharm.D.  Medication Therapy Management Pharmacist  [facility]    Telemedicine Visit Details  Type of service:  Telephone visit  Start Time:  12:31 PM  End Time: 12:53 PM     Medication Therapy Recommendations  No medication therapy recommendations to display       Again, thank you for allowing me to participate in the care of your patient.      Sincerely,    Melinda Hoyt RPH

## 2024-04-05 NOTE — PROGRESS NOTES
Medication Therapy Management (MTM) Encounter    ASSESSMENT:                            Medication Adherence/Access: No issues identified    CIDP:  Patient would benefit from IVIG therapy. I reviewed [facility] hospital records from 2021 and it appears he had received immune globulin (human) infusion 30 g 400 mg/kg (Ideal) Intravenous daily x 5 days.  I could not find the exact brand of IVIG he received but could call [facility] if determined that he should continue the same brand. The biggest issue is going to be cost of IVIG. Some drug manufacturers offer patient assistance programs which may be an option for this patient since his uninsured.     Hyperlipidemia/hypertension:    Due to lack of insurance and not having a current PCP, patient not currently taking blood pressure or cholesterol medication. Other health issues have become a priority at this time.       PLAN:                            Continue the potassium supplement through the weekend and have your potassium rechecked on Monday.  Start taking the new folic acid pills you have- 2 tabs of 200 mcg, twice daily. Okay to take with food if helps lessen GI side effects.   I am looking into patient assistance programs for IVIG. It looks like [employer] may have free-drug programs available for people who are uninsured. I will be discussing with Dr. Pedroza about the dosage and frequency of IVIG therapy that she would like you to try.     Follow-up: after discussing with Dr. Pedroza    SUBJECTIVE/OBJECTIVE:                          Prashant Rucker is a 48 year old male called for an initial visit. He was referred to me from Dr Pedroza.      Reason for visit: IVIG coordination    Allergies/ADRs: Reviewed in chart  Past Medical History: Reviewed in chart  Tobacco: He reports that he has been smoking cigarettes. He has never used smokeless tobacco.Nicotine/Tobacco Cessation Plan  Information offered: Patient not interested at this time - about a pack per day  "currently   Alcohol: states he has been reducing use over the last month, down to 2-3 drinks/day currently   THC: smokes marijuana occasionally     Medication Adherence/Access: no issues reported- medications are laid out on nightstand, takes meds at 10 am, 4 pm, 10 pm     CIDP:  - gabapentin 400 mg 3 times/day  - folic acid - planning to start taking 400 mcg twice daily   - pramipexole 0.25 mg twice daily - for RLS   - ibuprofen 400 mg daily    Patient has a history of Guillain-[place], was hospitalized at [facility] in 2021. During that hospitalization he received IVIG x 5 days which was very effective but he reports that after the first dose he \"tripped out\". He recalls that the psychosis was attributed to the steroids in the infusion so the steroids were removed for the remaining 4 doses and he tolerated those dose well with no issues. He never had any infusion reactions.     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 He has not found a PCP that he likes yet but plans to find a PCP. He has been following with Dr. Pedroza periodically and states his symptoms of CIDP were stable up until this past week. He was able to golf several rounds of golf last week and as of today is unable to walk. He is going to start using a walker today.     He went to the ER yesterday due to severely low potassium. He received IV potassium in the ER and his potassium went up from 2.8 to 2.9. The ER physician wanted to admit him but he opted to go home and will be taking oral potassium ER 20 mEq twice daily x 3 days and will have his potassium rechecked on Monday. He is also going to be doing a 24 hour urine collection over the weekend. He states he is not currently driving; his wife took away his keys. He will like to start on IVIG but is concerned about the cost without insurance. He expects it to cost $10,000 per treatment.     He " has anti-nausea meds to take at home. He is planning to restart folic acid. He had stopped taking it due to upset stomach. His friend gave him a bottle of folic acid 200 mcg tabs and he is going to try taking 400 mcg twice daily.     Hyperlipidemia/hypertension:    - atorvastatin- not currently taking as doesn't have a PCP  - metoprolol- not currently taking as doesn't have a PCP   - aspirin 81 mg daily   Patient reports no current medication side effects.    Today's Vitals: There were no vitals taken for this visit.  ----------------    I spent 22 minutes with this patient today. I offer these suggestions for consideration by Dr. Pedroza. A copy of the visit note was provided to the patient's provider(s).    A summary of these recommendations was sent via [employer].    Melinda Hoyt, Pharm.D.  Medication Therapy Management Pharmacist  [facility]    Telemedicine Visit Details  Type of service:  Telephone visit  Start Time:  12:31 PM  End Time: 12:53 PM     Medication Therapy Recommendations  No medication therapy recommendations to display

## 2024-04-05 NOTE — TELEPHONE ENCOUNTER
Called and talked to Taj about his folate level per Dr. Pedroza.  - See below    Taj wanted me to let Dr. Pedroza know that the soonest appointment for an EMG is in June and he said Dr. Pedroza wanted it done asap and asked what he should do.        He also mention that he went to the ER yesterday and was given 2 liters of potassium along with fluids and his potassium level only raised to .29 after that. The ER  prescribed him potassium to take as well.     Suly Lynch on 4/5/2024 at 12:06 PM

## 2024-04-05 NOTE — TELEPHONE ENCOUNTER
----- Message from Lori Pedroza MD sent at 4/5/2024  7:56 AM CDT -----  Please notify patient that his folate level is low again - he needs to get the folic acid in... he said it was bothering his stomach. If needed, he can take in smaller portions multiple times throughout the day Lori Pedroza MD on 4/5/2024   at 7:56 AM

## 2024-04-06 LAB
GD1B GANGL IGG SER IA-ACNC: 20 IV
GD1B GANGL IGM SER IA-ACNC: 113 IV
GM1 GANGL IGG SER IA-ACNC: 5 IV
GM1 GANGL IGM SER IA-ACNC: 5 IV
GQ1B GANGL IGG SER IA-ACNC: 14 IV
GQ1B GANGL IGM SER IA-ACNC: 95 IV

## 2024-04-08 RX ORDER — ACETAMINOPHEN 325 MG/1
650 TABLET ORAL ONCE
Start: 2024-04-08

## 2024-04-08 RX ORDER — DIPHENHYDRAMINE HCL 25 MG
50 CAPSULE ORAL ONCE
OUTPATIENT
Start: 2024-04-08

## 2024-04-08 RX ORDER — METHYLPREDNISOLONE SODIUM SUCCINATE 125 MG/2ML
125 INJECTION, POWDER, LYOPHILIZED, FOR SOLUTION INTRAMUSCULAR; INTRAVENOUS
Start: 2024-04-08

## 2024-04-08 RX ORDER — HEPARIN SODIUM (PORCINE) LOCK FLUSH IV SOLN 100 UNIT/ML 100 UNIT/ML
5 SOLUTION INTRAVENOUS
OUTPATIENT
Start: 2024-04-08

## 2024-04-08 RX ORDER — ALBUTEROL SULFATE 0.83 MG/ML
2.5 SOLUTION RESPIRATORY (INHALATION)
OUTPATIENT
Start: 2024-04-08

## 2024-04-08 RX ORDER — DIPHENHYDRAMINE HYDROCHLORIDE 50 MG/ML
50 INJECTION INTRAMUSCULAR; INTRAVENOUS
Start: 2024-04-08

## 2024-04-08 RX ORDER — EPINEPHRINE 1 MG/ML
0.3 INJECTION, SOLUTION, CONCENTRATE INTRAVENOUS EVERY 5 MIN PRN
OUTPATIENT
Start: 2024-04-08

## 2024-04-08 RX ORDER — HEPARIN SODIUM,PORCINE 10 UNIT/ML
5-20 VIAL (ML) INTRAVENOUS DAILY PRN
OUTPATIENT
Start: 2024-04-08

## 2024-04-08 RX ORDER — ALBUTEROL SULFATE 90 UG/1
1-2 AEROSOL, METERED RESPIRATORY (INHALATION)
Start: 2024-04-08

## 2024-04-08 RX ORDER — IMMUNE GLOBULIN (HUMAN) 10 G/100ML
51 INJECTION INTRAVENOUS; SUBCUTANEOUS DAILY
Status: SHIPPED
Start: 2024-04-08 | End: 2024-04-12

## 2024-04-08 NOTE — TELEPHONE ENCOUNTER
I have sent a message to [facility] for a price quote on nursing/supplies (patient would be paying out of pocket for these costs). Gamunex-C would be free if he qualifies for the PAP. We will need patient and provider to sign the  PAP form for Gamunex-C application.     Therapy plan placed. No print out order signed.    Called patient to obtain his current weight (225 lb today in ER) and see if he would like me to start the application. He stated that he is actually at [facility] currently and will be transferred to [facility]. He is unsure if he will be getting the IVIG infusions done in the hospital at this time. He stated he would call me back with an update later today.    Melinda Hoyt, Pharm.D.  Medication Therapy Management Pharmacist  [facility]

## 2024-04-08 NOTE — TELEPHONE ENCOUNTER
I will often do 500 mg/kg daily for 4 days     Ok to remove steroids before infusion     Thank you!  Lori Pedroza MD on 4/8/2024 at 1:06 PM

## 2024-04-08 NOTE — TELEPHONE ENCOUNTER
Patient's wife Kaycee called me back and informed me that they are admitting Taj to [facility] and he will likely receive IVIG in the hospital starting this week.     I will hold off on starting the application for the Gamunex-C free drug until I receive another update from them.     Melinda Hoyt, Pharm.D.  Medication Therapy Management Pharmacist  [facility]

## 2024-04-09 NOTE — TELEPHONE ENCOUNTER
I see that he is now scheduled 5/1 - this is adequate  Thanks, Lori Pedroza MD on 4/9/2024 at 5:23 PM

## 2024-04-10 NOTE — TELEPHONE ENCOUNTER
Melinda - no need to do it right now. I reached out to Dr. Mattson who is giving him IVIG. He likely will need long term treatment, but he will need to establish follow up and have the emg done. Lori Pedroza MD

## 2024-04-10 NOTE — TELEPHONE ENCOUNTER
Update from \[facility] [place] [facility]\"":  Private pay cost for supplies is $107.63 per day. Nursing cost will be $90 per visit. The cost for in the home vs the infusion suite is the same.     Will discuss with Dr. Pedroza whether we should move forward with IVIG PAP at this time.    Melinda Hoyt, Pharm.D.  Medication Therapy Management Pharmacist  [facility]

## 2024-05-01 ENCOUNTER — OFFICE VISIT (OUTPATIENT)
Dept: NEUROLOGY | Facility: CLINIC | Age: 49
End: 2024-05-01

## 2024-05-01 DIAGNOSIS — G60.8 ACUTE SENSORY NEUROPATHY: ICD-10-CM

## 2024-05-01 PROCEDURE — 95885 MUSC TST DONE W/NERV TST LIM: CPT | Performed by: PSYCHIATRY & NEUROLOGY

## 2024-05-01 PROCEDURE — 95911 NRV CNDJ TEST 9-10 STUDIES: CPT | Performed by: PSYCHIATRY & NEUROLOGY

## 2024-05-01 NOTE — PROGRESS NOTES
[facility]  Electrodiagnostic Laboratory                 Department of Neurology                                                                                                         Test Date:  2024    Patient: Prashant Rucker : 1975 Physician: Nancy Dang MD   Sex: Male AGE: 48 year Ref Phys: Lori Pedroza MD   ID#: [id number]   Technician: Kristin Marte     History and Examination:  Prashant Rucker is a 48-year-old gentleman with a past medical history significant for diagnosis of Guillain-Barré syndrome.  He has had positive GD1B antibodies back in .  6 lumbar punctures have shown normal proteins x 2 back in  and again more recently.  The patient has sensory loss resulting in significant balance problems due to sensory ataxia    Techniques:  Motor conduction studies were done with surface recording electrodes. Sensory conduction studies were performed with surface electrodes, unless indicated otherwise by (n), designating the use of subdermal recording electrodes. Temperature was monitored and recorded throughout the study. Upper extremities were maintained at a temperature of 32 degrees Centigrade or higher.  EMG was done with a concentric needle electrode.     Results:  Motor nerve studies:  Left fibular motor, tibial motor, median motor and ulnar motor studies are all within normal limits.    F waves are within normal limits.    Sensory studies:  No response with left superficial fibular sensory study, left radial sensory study or left ulnar sensory study.  The left sural study is low in amplitude.  Left median sensory study is normal.    Blink reflexes were tested with prolonged motor response.    All examined muscles (as indicated in the following table) showed no evidence of electrical instability.        Interpretation:    This is an abnormal EMG.  Findings are suggestive of a severe sensory neuropathy.  Clinical correlation is recommended as  to whether this is remnants of past sensory neuropathy attack or more acute and findings.  Serial EMGs may be helpful in monitoring symptoms moving forward.    ___________________________  Nancy Dang MD        Nerve Conduction Studies  Motor Sites      Latency Amplitude Neg. Amp Diff Segment Distance Velocity Neg. Dur Neg Area Diff Temperature Comment   Site (ms) Norm (mV) Norm (%)  cm m/s Norm (ms) (%) ( C)    Left Fibular (EDB) Motor   Ankle 3.8  < 6.0 4.2  > 2.5  Ankle-EDB 8   5.3  31.2    Bel Fibular Head 10.2 - 3.2 - -24 Bel Fibular Head-Ankle 29.6 46  > 38 6.0 -11 31.5    Pop Fossa 11.8 - 3.1 - -3 Pop Fossa-Bel Fibular Head 9.1 57  > 38 6.2 1 31.6    Left Median (APB) Motor   Wrist 3.9  < 4.4 6.3  > 5.0  Wrist-APB 7.9   5.4  30.6    Elbow 8.4 - 6.1  > 5.0 -3 Elbow-Wrist 23.7 53  > 48 5.6 -6 31.4    Left Tibial (AHB) Motor   Ankle 4.0  < 6.5 11.7  > 5.0  Ankle-AH 7   5.6  31.7    Knee 13.0 - 9.8 - -16 Knee-Ankle 38.2 42  > 38 6.1 -9 31.7    Left Ulnar (ADM) Motor   Wrist 2.7  < 3.5 6.7  > 5.0  Wrist-ADM 8   5.9  31.9    Below Elbow 6.7 - 6.6 - -1 Below Elbow-Wrist 19.3 48  > 48 5.7 -8 32    Above Elbow 8.4 - 6.0 - -9 Above Elbow-Below Elbow 8.5 50  > 48 6.2 -3 32      F-Wave Sites      Min F-Lat Max-Min F-Lat Mean F-Lat   Site (ms) (ms) (ms)   Left Fibular F-Wave   Ankle 48.8 26.2 -   Left Median F-Wave   Wrist 23.8 5.4 27.2   Left Tibial F-Wave   Ankle 54.1 2.0 -   Left Ulnar F-Wave   Wrist 29.7 1.40 30.4     Sensory Sites      Onset Lat Peak Lat Amp (O-P) Amp (P-P) Segment Distance Velocity Temperature Comment   Site ms (ms)  V Norm ( V)  cm m/s Norm ( C)    Left Median Sensory   Wrist-Dig II 0.60 1.68 14  > 10 6 Wrist-Dig II 15 250  > 48 31.8    Left Radial Sensory   Forearm-Wrist NR NR NR  > 15 NR Forearm-Wrist 10 NR - 32    Left Superficial Fibular Sensory   14 cm-Ankle NR NR NR  > 3 NR 14 cm-Ankle 12.5 NR  > 38 31.6    Left Sural Sensory   Calf 2.7 3.5 3  > 5 4 Calf-Lat Malleolus 11.5 43  > 38 31.7     Left Ulnar Sensory   Wrist-Dig V NR NR NR  > 8 NR Wrist-Dig V 12.5 NR  > 48 31.9      Blink     Trial NR R1 (ms) R2i (ms) R2c (ms) [id number] (ms) Comments   Norm  <13 <40 <41     Trial6 - L    10.9 40.3 48.9 8.60    Trial13 - R    9.7 38.1 44.5 6.40    L-R Norm  <1.2       L-R  1.2 2.2 4.4 2.20        Electromyography     Side Muscle Ins Act Fibs/PSW Fasc HF Amp Dur Poly Recrt Int Pat   Left Tib ant Nml None Nml 0 Nml Nml 0 Nml Nml   Left Gastroc, med Nml None Nml 0 Nml Nml 0 Nml Nml   Left Vastus lat Nml None Nml 0 Nml Nml 0 Nml Nml   Left FDI Nml None Nml 0 Nml Nml 0 Nml Nml   Left Pronator Teres Nml None Nml 0 Nml Nml 0 Nml Nml   Left Biceps Nml None Nml 0 Nml Nml 0 Nml Nml         NCS Waveforms:    Motor                Sensory                  F-Wave                   Ultrasound Images:

## 2024-05-01 NOTE — LETTER
2024         RE: Prashant Rucker  [address] [address]        Dear Colleague,    Thank you for referring your patient, Prashant Rucker, to the [facility]. Please see a copy of my visit note below.                        [facility]  Electrodiagnostic Laboratory                 Department of Neurology                                                                                                         Test Date:  2024    Patient: Prashant Rucker : 1975 Physician: Nancy Dang MD   Sex: Male AGE: 48 year Ref Phys: Lori Pedroza MD   ID#: [id number]   Technician: Kristin Marte     History and Examination:  Prashant Rucker is a 48-year-old gentleman with a past medical history significant for diagnosis of Guillain-Barré syndrome.  He has had positive GD1B antibodies back in .  6 lumbar punctures have shown normal proteins x 2 back in  and again more recently.  The patient has sensory loss resulting in significant balance problems due to sensory ataxia    Techniques:  Motor conduction studies were done with surface recording electrodes. Sensory conduction studies were performed with surface electrodes, unless indicated otherwise by (n), designating the use of subdermal recording electrodes. Temperature was monitored and recorded throughout the study. Upper extremities were maintained at a temperature of 32 degrees Centigrade or higher.  EMG was done with a concentric needle electrode.     Results:  Motor nerve studies:  Left fibular motor, tibial motor, median motor and ulnar motor studies are all within normal limits.    F waves are within normal limits.    Sensory studies:  No response with left superficial fibular sensory study, left radial sensory study or left ulnar sensory study.  The left sural study is low in amplitude.  Left median sensory study is normal.    Blink reflexes were tested with prolonged motor  response.    All examined muscles (as indicated in the following table) showed no evidence of electrical instability.        Interpretation:    This is an abnormal EMG.  Findings are suggestive of a severe sensory neuropathy.  Clinical correlation is recommended as to whether this is remnants of past sensory neuropathy attack or more acute and findings.  Serial EMGs may be helpful in monitoring symptoms moving forward.    ___________________________  Nancy Dang MD        Nerve Conduction Studies  Motor Sites      Latency Amplitude Neg. Amp Diff Segment Distance Velocity Neg. Dur Neg Area Diff Temperature Comment   Site (ms) Norm (mV) Norm (%)  cm m/s Norm (ms) (%) ( C)    Left Fibular (EDB) Motor   Ankle 3.8  < 6.0 4.2  > 2.5  Ankle-EDB 8   5.3  31.2    Bel Fibular Head 10.2 - 3.2 - -24 Bel Fibular Head-Ankle 29.6 46  > 38 6.0 -11 31.5    Pop Fossa 11.8 - 3.1 - -3 Pop Fossa-Bel Fibular Head 9.1 57  > 38 6.2 1 31.6    Left Median (APB) Motor   Wrist 3.9  < 4.4 6.3  > 5.0  Wrist-APB 7.9   5.4  30.6    Elbow 8.4 - 6.1  > 5.0 -3 Elbow-Wrist 23.7 53  > 48 5.6 -6 31.4    Left Tibial (AHB) Motor   Ankle 4.0  < 6.5 11.7  > 5.0  Ankle-AH 7   5.6  31.7    Knee 13.0 - 9.8 - -16 Knee-Ankle 38.2 42  > 38 6.1 -9 31.7    Left Ulnar (ADM) Motor   Wrist 2.7  < 3.5 6.7  > 5.0  Wrist-ADM 8   5.9  31.9    Below Elbow 6.7 - 6.6 - -1 Below Elbow-Wrist 19.3 48  > 48 5.7 -8 32    Above Elbow 8.4 - 6.0 - -9 Above Elbow-Below Elbow 8.5 50  > 48 6.2 -3 32      F-Wave Sites      Min F-Lat Max-Min F-Lat Mean F-Lat   Site (ms) (ms) (ms)   Left Fibular F-Wave   Ankle 48.8 26.2 -   Left Median F-Wave   Wrist 23.8 5.4 27.2   Left Tibial F-Wave   Ankle 54.1 2.0 -   Left Ulnar F-Wave   Wrist 29.7 1.40 30.4     Sensory Sites      Onset Lat Peak Lat Amp (O-P) Amp (P-P) Segment Distance Velocity Temperature Comment   Site ms (ms)  V Norm ( V)  cm m/s Norm ( C)    Left Median Sensory   Wrist-Dig II 0.60 1.68 14  > 10 6 Wrist-Dig II 15 250  > 48 31.8     Left Radial Sensory   Forearm-Wrist NR NR NR  > 15 NR Forearm-Wrist 10 NR - 32    Left Superficial Fibular Sensory   14 cm-Ankle NR NR NR  > 3 NR 14 cm-Ankle 12.5 NR  > 38 31.6    Left Sural Sensory   Calf 2.7 3.5 3  > 5 4 Calf-Lat Malleolus 11.5 43  > 38 31.7    Left Ulnar Sensory   Wrist-Dig V NR NR NR  > 8 NR Wrist-Dig V 12.5 NR  > 48 31.9      Blink     Trial NR R1 (ms) R2i (ms) R2c (ms) [id number] (ms) Comments   Norm  <13 <40 <41     Trial6 - L    10.9 40.3 48.9 8.60    Trial13 - R    9.7 38.1 44.5 6.40    L-R Norm  <1.2       L-R  1.2 2.2 4.4 2.20        Electromyography     Side Muscle Ins Act Fibs/PSW Fasc HF Amp Dur Poly Recrt Int Pat   Left Tib ant Nml None Nml 0 Nml Nml 0 Nml Nml   Left Gastroc, med Nml None Nml 0 Nml Nml 0 Nml Nml   Left Vastus lat Nml None Nml 0 Nml Nml 0 Nml Nml   Left FDI Nml None Nml 0 Nml Nml 0 Nml Nml   Left Pronator Teres Nml None Nml 0 Nml Nml 0 Nml Nml   Left Biceps Nml None Nml 0 Nml Nml 0 Nml Nml         NCS Waveforms:    Motor                Sensory                  F-Wave                   Ultrasound Images:                Again, thank you for allowing me to participate in the care of your patient.        Sincerely,        Nancy Dang MD

## 2024-05-02 NOTE — RESULT ENCOUNTER NOTE
Please notify patient that EMG does confirm that he has a severe sensory neuropathy that can be seen with inflammation. I recommend that he see a peripheral nerve specialist as this is not a condition that I manage.     Referral was placed.  It would be reasonable for him to cancel his appointment with me in favor of seeing my colleague.   Lori Pedroza MD on 5/2/2024 at 1:27 PM

## 2024-05-03 ENCOUNTER — TELEPHONE (OUTPATIENT)
Dept: NEUROLOGY | Facility: CLINIC | Age: 49
End: 2024-05-03

## 2024-05-03 NOTE — TELEPHONE ENCOUNTER
----- Message from Lori Pedroza MD sent at 5/2/2024  1:28 PM CDT -----  Please notify patient that EMG does confirm that he has a severe sensory neuropathy that can be seen with inflammation. I recommend that he see a peripheral nerve specialist as this is not a condition that I manage.     Referral was placed.  It would be reasonable for him to cancel his appointment with me in favor of seeing my colleague.   Lori Pedroza MD on 5/2/2024 at 1:27 PM

## 2024-05-06 NOTE — TELEPHONE ENCOUNTER
Called pt and relayed Dr. Pedroza message below. Pt verbalized understanding and does not have any questions.       JERICHO Guevara on 5/6/2024 at 2:53 PM

## 2024-05-19 ENCOUNTER — HEALTH MAINTENANCE LETTER (OUTPATIENT)
Age: 49
End: 2024-05-19

## 2024-07-08 ENCOUNTER — OFFICE VISIT (OUTPATIENT)
Dept: NEUROLOGY | Facility: CLINIC | Age: 49
End: 2024-07-08
Attending: PSYCHIATRY & NEUROLOGY

## 2024-07-08 VITALS — SYSTOLIC BLOOD PRESSURE: 109 MMHG | HEART RATE: 66 BPM | OXYGEN SATURATION: 98 % | DIASTOLIC BLOOD PRESSURE: 72 MMHG

## 2024-07-08 DIAGNOSIS — K70.30 ALCOHOLIC CIRRHOSIS OF LIVER WITHOUT ASCITES (H): ICD-10-CM

## 2024-07-08 DIAGNOSIS — E53.1 VITAMIN B6 DEFICIENCY: ICD-10-CM

## 2024-07-08 DIAGNOSIS — G62.9 SENSORY NEUROPATHY: Primary | ICD-10-CM

## 2024-07-08 PROBLEM — M62.81 MUSCLE WEAKNESS (GENERALIZED): Status: ACTIVE | Noted: 2021-05-18

## 2024-07-08 PROBLEM — K70.10 ALCOHOLIC HEPATITIS WITHOUT ASCITES (H): Status: ACTIVE | Noted: 2024-04-09

## 2024-07-08 PROBLEM — I63.9 CEREBROVASCULAR ACCIDENT (CVA) (H): Status: ACTIVE | Noted: 2021-05-25

## 2024-07-08 PROBLEM — E53.8 FOLATE DEFICIENCY: Status: ACTIVE | Noted: 2024-04-10

## 2024-07-08 PROBLEM — R11.2 INTRACTABLE NAUSEA AND VOMITING: Status: ACTIVE | Noted: 2024-04-08

## 2024-07-08 PROBLEM — E87.1 HYPONATREMIA: Status: ACTIVE | Noted: 2024-04-10

## 2024-07-08 PROBLEM — F17.200 TOBACCO USE DISORDER: Status: ACTIVE | Noted: 2024-04-09

## 2024-07-08 PROBLEM — D64.9 NORMOCYTIC ANEMIA: Status: ACTIVE | Noted: 2024-04-10

## 2024-07-08 PROBLEM — E88.09 HYPOALBUMINEMIA: Status: ACTIVE | Noted: 2024-04-10

## 2024-07-08 PROBLEM — F10.10 ALCOHOL ABUSE: Status: ACTIVE | Noted: 2024-07-08

## 2024-07-08 PROBLEM — D68.9 COAGULOPATHY (H): Status: ACTIVE | Noted: 2024-04-10

## 2024-07-08 PROBLEM — R74.8 ELEVATED LIVER ENZYMES: Status: ACTIVE | Noted: 2021-05-13

## 2024-07-08 PROBLEM — R27.0 ATAXIA: Status: ACTIVE | Noted: 2021-05-18

## 2024-07-08 PROBLEM — D69.6 THROMBOCYTOPENIA (H): Status: ACTIVE | Noted: 2024-04-09

## 2024-07-08 PROBLEM — F10.90 ALCOHOL USE DISORDER: Status: ACTIVE | Noted: 2024-04-09

## 2024-07-08 PROBLEM — J45.909 AB (ASTHMATIC BRONCHITIS): Status: ACTIVE | Noted: 2021-05-13

## 2024-07-08 PROBLEM — E66.9 OBESITY: Status: ACTIVE | Noted: 2021-06-03

## 2024-07-08 PROCEDURE — 99205 OFFICE O/P NEW HI 60 MIN: CPT | Performed by: STUDENT IN AN ORGANIZED HEALTH CARE EDUCATION/TRAINING PROGRAM

## 2024-07-08 PROCEDURE — 99417 PROLNG OP E/M EACH 15 MIN: CPT | Performed by: STUDENT IN AN ORGANIZED HEALTH CARE EDUCATION/TRAINING PROGRAM

## 2024-07-08 RX ORDER — PYRIDOXINE HCL (VITAMIN B6) 25 MG
25 TABLET ORAL DAILY
Qty: 60 TABLET | Refills: 0 | Status: SHIPPED | OUTPATIENT
Start: 2024-07-08

## 2024-07-08 RX ORDER — 1.1% SODIUM FLUORIDE 11 MG/G
GEL DENTAL DAILY
COMMUNITY
Start: 2024-06-08

## 2024-07-08 RX ORDER — GAUZE BANDAGE 2" X 2"
100 BANDAGE TOPICAL DAILY
COMMUNITY
Start: 2024-04-19

## 2024-07-08 NOTE — LETTER
7/8/2024      Prashant Rucker  [address] [address]      Dear Colleague,    Thank you for referring your patient, Prashant Rucker, to the [facility] NEUROLOG[facility]. Please see a copy of my visit note below.    CHIEF COMPLAINT / REASON FOR VISIT  Sensory neuropathy    Referred by Dr. Pedroza    HISTORY OF PRESENT ILLNESS   is a 48 year old male presenting to [facility] for evaluation of sensory neuropathy. Patient is accompanied by his son, who helped provide collateral history today    His medical history significant for   -alcoholic cirrhosis with portal hypertension  -History of Guillain-Barré syndrome in 2021. At that time, he presented with numbness and weakness in feet and hands that progressed over 6 to 8 weeks to the point that he was having trouble holding pencil and walking on steps.  He was hospitalized and received IVIG x 5 days with improvement.  He was discharged from the hospital with a walker but was able to walk independently 1 month after hospitalization.    After hospitalization in 2021, he has experienced continuous improvement in muscle strength to the point that he was able to golf again in 2023.  However, the numbness in hands and feet have never really changed.     In January-March 2024, he was having frequent nausea and vomiting to the point that he lost about 50 pounds over 6 months.  Around the same time, he feels that the numbness in the hands and feet are little worse.  He also developed generalized fatigue and weakness in the end of March 2024.  He was found to have hypokalemia (K2.8), hyponatremia (Na 131), and hepatitis (likely alcoholic) in early April.  He received potassium replacement and stopped drinking alcohol with significant improvement in overall strength.  However, the numbness in hands and feet have not improved.  He was then hospitalized and was given another round of IVIG without significant change of  symptoms.    His oral intake has significantly improved after cessation of alcohol.  He is now able to golf again.  He continues to have numbness in both hands and feet.  He has trouble with balance when he closes his eyes.  He cannot  his golf club as strong as he used to.  He has contact allodynia in his feet at nighttime.  He denies any neck pain, back pain, or radicular pain.  No issues with bowel or bladder control.  No double vision.  No trouble swallowing.    He is currently taking gabapentin 400-400-800 milligram.    I have reviewed prior investigations as listed below:  -EMG performed by Dr. Dang on 5/1/2024 showed sensory neuropathy.  -CSF 4/9/2024: Protein 29, 1 white blood cell, glucose 55, negative oligoclonal band, negative paraneoplastic panel, VDRL nonreactive, negative cytology, negative NMO, negative meningitis panel  -Hemoglobin A1c 4.8%  -Liver function test: AST 66, ALT 31, , total bilirubin 1.9  -Ganglioside antibody was positive for GD 1A, GD 1B, GQ 1B  -SPEP no M protein.  Kappa light chain was elevated at 4.93, lambda light chain was elevated at 3.29, kappa/lambda ratio was normal  -Vitamin B6 was low at 6.8%, vitamin E was low at 4.8  -Vitamin B12 876  -CK24  -Serum paraneoplastic antibody negative including ANNA1  -Chronic hepatitis profile negative  - HEAD MRI:   1.  No acute intracranial process.  - CERVICAL SPINE MRI:  1.  Within the limits of motion, no convincing abnormal cord signal.  2.  High-grade foraminal narrowing on the right at C5-C6.        - THORACIC SPINE MRI:  1.  No spinal cord signal abnormality.  2.  Minimal spondylosis without spinal canal or foraminal stenosis.  - LUMBAR SPINE MRI:  1.  No abnormal signal within the distal spinal cord or conus medullaris.  2.  Mild spondylosis with mild foraminal stenoses as detailed. No spinal canal stenosis.  - CT Abd Pelvis:   IMPRESSION:   1.  Cirrhosis with manifestations portal hypertension.  2.  Mild nonspecific  fat stranding and trace ascites in the retroperitoneum along the paracolic gutters and anterior pararenal fascia. These findings are likely related to portal hypertension and third spacing although there are a few areas which are somewhat nodular in appearance as seen in the left paracolic gutter. These are new from 05/27/2021 and other considerations remain in the differential including peritoneal carcinomatosis. Follow-up in 3 months is recommended to assess for interval change. No new or enlarging adenopathy.  3.  Cholelithiasis.    REVIEW OF SYSTEMS  All negative except for what indicated in the HPI. The following portions of the patient's history were reviewed and updated as appropriate: allergies, current medications, family history, medical history, surgical history, social history, and problem list.     PAST MEDICAL/SURGICAL HISTORY   No past medical history on file.  Past Surgical History:   Procedure Laterality Date     IR LUMBAR PUNCTURE  5/27/2021     IR LUMBAR PUNCTURE  4/9/2024        MEDICATIONS    Current Outpatient Medications:      aspirin (ASA) 81 MG EC tablet, Take 81 mg by mouth daily, Disp: , Rfl:      folic acid (FOLVITE) 1 MG tablet, Take 1 tablet (1 mg) by mouth daily, Disp: 90 tablet, Rfl: 3     gabapentin (NEURONTIN) 400 MG capsule, Take 1 capsule (400 mg) by mouth 3 times daily. May also take 1 capsule (400 mg) daily as needed for neuropathic pain., Disp: 360 capsule, Rfl: 3     ibuprofen (ADVIL/MOTRIN) 200 MG tablet, Take 400 mg by mouth nightly as needed, Disp: , Rfl:      ondansetron (ZOFRAN ODT) 4 MG ODT tab, Take 1 tablet by mouth every 8 hours as needed, Disp: , Rfl:      pramipexole (MIRAPEX) 0.25 MG tablet, Take 1 tablet (0.25 mg) by mouth 2 times daily, Disp: 180 tablet, Rfl: 3     prochlorperazine (COMPAZINE) 10 MG tablet, Take 1 tablet (10 mg) by mouth every 6 hours as needed for nausea or vomiting, Disp: 30 tablet, Rfl: 1    ALLERGIES:  No Known Allergies    PHYSICAL  EXAM  There were no vitals taken for this visit.    NEUROLOGICAL EXAMINATION  Mental status: normal.  Speech: normal.  High arched feet: Absent  Hammertoes: Present bilaterally  Coordination: normal rapid alternating movements and finger to nose testing  Gait: Wide-based, slightly sensory ataxia  Walk on heels: yes, bilaterally.  Walk on toes: yes, bilaterally.    Getting up from seated position without pushing on chair: yes.  Posture: normal.  Romberg: Positive    The Neuropathy Impairment Scoring System (NIS) strength scale was utilized.    0=normal; -1=25% weak; -2=50% weak; -3=75% weak; -3.25=movement against gravity;   -3.5=movement, gravity eliminated;-3.75=minimal contraction; -4= paralysis.  Cranial nerves   R Muscle strength L  R  L   0 Frontalis 0   Visual acuity    0 Orbicularis oculi 0  3 mm Pupils 3 mm   0 Lower facial muscles 0  0 Light reflex 0   0 Temporal-Masseter 0  0 Ptosis 0   0 Palate-Pharynx 0  0 Extraocular muscles 0   0 Sternocleidomastoid 0       0 Trapezius 0   Hearing    0 Genioglossus 0              R Muscle, strength L  R Muscle, strength L    Neck     Trunk    0 Neck flexors 0   Abdominal muscles    0 Neck extensors 0   Paraspinals     Upper Limbs    Lower Limbs    0 Supraspinatus 0  0 Iliopsoas 0   0 Infraspinatus 0  0 Adductors, thigh 0   0 Pectoralis 0  0 Gluteus medius 0   0 Deltoid 0  0 Gluteus max 0   0 Biceps brachii 0  0 Quadriceps 0   0 Brachioradialis 0  0 Hamstrings 0    Supinator/pronator   0 Tibialis anterior 0   0 Triceps 0  0 Peronei 0   0 Wrist extensor 0  0 EHL 0   0 Wrist flexors 0  0 Toe extensors 0   0 Digit extensors 0  0 Tibialis post. 0   0 Digit flexors 0  0 Toe flexors 0   0 Thenar 0  0 Calf muscles 0   0 Hypothenar 0       0 Interossei 0            R Reflexes L  R Sensation L   For NIS:  Normal=0 Reduced=1 Absent=2     0 Biceps brachii 0   Finger index    0 Brachioradialis 0  0 Cold 0   0 Triceps 0  -2 Pinprick -2   0 Mckenzie 0  0 Vibration 0   -4 Quadriceps -4   -1 Joint position -1   -4 Gastroc-soleus -4   Toes (Great)    0 Clonus (ankle) 0  -1 Cold -1   Flexor Plantar response Flexor  -3 Pinprick -3     -4 Vibration -4     -3 Joint position -3          ASSESSMENT / PLAN  #1 Chronic length-dependent sensory neuropathy likely nutritional deficiency in the setting of heavy alcohol use and poor oral intake  #2 History of GBS  #3 Alcoholic cirrhosis with portal hypertension  #4 Vitamin B6 deficiency     Mr. Rucker's neurological exam and EMG findings are consistent with a length-dependent sensory neuropathy. The etiology of neuropathy is most likely nutritional deficiency in the setting of heavy alcohol use and poor oral intake (as evidenced by hypokalemia, weight loss, and low vitamins). Inflammatory process is much less likely at this point given chronic course over several years and stabilization with the improvement of oral intake. Immunosuppressive therapy is not indicated at this time.  Treatment at this point would be mainly supportive.  I am glad that he has stopped heavy drinking and his oral intake has improved.  He understands that there will likely be residual numbness in his hands and feet but the symptoms should not significantly progress as long as he maintains his nutritional status.    After discussion with Mr. Rucker, we have agreed to proceed with the following investigations and management:    Recommendations:  -vitamin B6 supplement 25 mg daily for 2 months.  He should have the level rechecked with his PCP after 2 months  -Continue vitamin B1 supplement  -Continue active lifestyle and regular exercise.  He has completed physical therapy and has learned how to exercise by himself.  -Foot care is important for peripheral neuropathy, especially when there is loss of sensation.  I recommend that feet be inspected daily for injuries, and moisturizing in order to prevent skin breakdown.    -He should not walk in the dark as his proprioceptive sense is  impaired.  -He can continue to use gabapentin 400-400-800 for contact allodynia and hypersensitivity  -Follow-up in 6 months.    I spent a total of 75 minutes on the day of the visit for chart review, face-to-face visit, counseling/coordination of care, and documentation. Please see the note for further information on patient assessment and treatment.       PATIENT EDUCATION  Ready to learn, no apparent learning barriers were identified; learning preferences include listening.  Explained diagnosis and treatment plan; patient expressed understanding of the content.      Again, thank you for allowing me to participate in the care of your patient.        Sincerely,        Jaden Chou MD

## 2024-07-08 NOTE — PROGRESS NOTES
CHIEF COMPLAINT / REASON FOR VISIT  Sensory neuropathy    Referred by Dr. Pedroza    HISTORY OF PRESENT ILLNESS   is a 48 year old male [facility] evaluation of sensory neuropathy. Patient is accompanied by his son, who helped provide collateral history today    His medical history significant for   -alcoholic cirrhosis with portal hypertension  -History of Guillain-Barré syndrome in 2021. At that time, he presented with numbness and weakness in feet and hands that progressed over 6 to 8 weeks to the point that he was having trouble holding pencil and walking on steps.  He was hospitalized and received IVIG x 5 days with improvement.  He was discharged from the hospital with a walker but was able to walk independently 1 month after hospitalization.    After hospitalization in 2021, he has experienced continuous improvement in muscle strength to the point that he was able to golf again in 2023.  However, the numbness in hands and feet have never really changed.     In January-March 2024, he was having frequent nausea and vomiting to the point that he lost about 50 pounds over 6 months.  Around the same time, he feels that the numbness in the hands and feet are little worse.  He also developed generalized fatigue and weakness in the end of March 2024.  He was found to have hypokalemia (K2.8), hyponatremia (Na 131), and hepatitis (likely alcoholic) in early April.  He received potassium replacement and stopped drinking alcohol with significant improvement in overall strength.  However, the numbness in hands and feet have not improved.  He was then hospitalized and was given another round of IVIG without significant change of symptoms.    His oral intake has significantly improved after cessation of alcohol.  He is now able to golf again.  He continues to have numbness in both hands and feet.  He has trouble with balance when he closes his eyes.  He cannot  his golf club as  strong as he used to.  He has contact allodynia in his feet at nighttime.  He denies any neck pain, back pain, or radicular pain.  No issues with bowel or bladder control.  No double vision.  No trouble swallowing.    He is currently taking gabapentin 400-400-800 milligram.    I have reviewed prior investigations as listed below:  -EMG performed by Dr. Dang on 5/1/2024 showed sensory neuropathy.  -CSF 4/9/2024: Protein 29, 1 white blood cell, glucose 55, negative oligoclonal band, negative paraneoplastic panel, VDRL nonreactive, negative cytology, negative NMO, negative meningitis panel  -Hemoglobin A1c 4.8%  -Liver function test: AST 66, ALT 31, , total bilirubin 1.9  -Ganglioside antibody was positive for GD 1A, GD 1B, GQ 1B  -SPEP no M protein.  Kappa light chain was elevated at 4.93, lambda light chain was elevated at 3.29, kappa/lambda ratio was normal  -Vitamin B6 was low at 6.8%, vitamin E was low at 4.8  -Vitamin B12 876  -CK24  -Serum paraneoplastic antibody negative including ANNA1  -Chronic hepatitis profile negative  - HEAD MRI:   1.  No acute intracranial process.  - CERVICAL SPINE MRI:  1.  Within the limits of motion, no convincing abnormal cord signal.  2.  High-grade foraminal narrowing on the right at C5-C6.        - THORACIC SPINE MRI:  1.  No spinal cord signal abnormality.  2.  Minimal spondylosis without spinal canal or foraminal stenosis.  - LUMBAR SPINE MRI:  1.  No abnormal signal within the distal spinal cord or conus medullaris.  2.  Mild spondylosis with mild foraminal stenoses as detailed. No spinal canal stenosis.  - CT Abd Pelvis:   IMPRESSION:   1.  Cirrhosis with manifestations portal hypertension.  2.  Mild nonspecific fat stranding and trace ascites in the retroperitoneum along the paracolic gutters and anterior pararenal fascia. These findings are likely related to portal hypertension and third spacing although there are a few areas which are somewhat nodular in  appearance as seen in the left paracolic gutter. These are new from 05/27/2021 and other considerations remain in the differential including peritoneal carcinomatosis. Follow-up in 3 months is recommended to assess for interval change. No new or enlarging adenopathy.  3.  Cholelithiasis.    REVIEW OF SYSTEMS  All negative except for what indicated in the HPI. The following portions of the patient's history were reviewed and updated as appropriate: allergies, current medications, family history, medical history, surgical history, social history, and problem list.     PAST MEDICAL/SURGICAL HISTORY   No past medical history on file.  Past Surgical History:   Procedure Laterality Date    IR LUMBAR PUNCTURE  5/27/2021    IR LUMBAR PUNCTURE  4/9/2024        MEDICATIONS    Current Outpatient Medications:     aspirin (ASA) 81 MG EC tablet, Take 81 mg by mouth daily, Disp: , Rfl:     folic acid (FOLVITE) 1 MG tablet, Take 1 tablet (1 mg) by mouth daily, Disp: 90 tablet, Rfl: 3    gabapentin (NEURONTIN) 400 MG capsule, Take 1 capsule (400 mg) by mouth 3 times daily. May also take 1 capsule (400 mg) daily as needed for neuropathic pain., Disp: 360 capsule, Rfl: 3    ibuprofen (ADVIL/MOTRIN) 200 MG tablet, Take 400 mg by mouth nightly as needed, Disp: , Rfl:     ondansetron (ZOFRAN ODT) 4 MG ODT tab, Take 1 tablet by mouth every 8 hours as needed, Disp: , Rfl:     pramipexole (MIRAPEX) 0.25 MG tablet, Take 1 tablet (0.25 mg) by mouth 2 times daily, Disp: 180 tablet, Rfl: 3    prochlorperazine (COMPAZINE) 10 MG tablet, Take 1 tablet (10 mg) by mouth every 6 hours as needed for nausea or vomiting, Disp: 30 tablet, Rfl: 1    ALLERGIES:  No Known Allergies    PHYSICAL EXAM  There were no vitals taken for this visit.    NEUROLOGICAL EXAMINATION  Mental status: normal.  Speech: normal.  High arched feet: Absent  Hammertoes: Present bilaterally  Coordination: normal rapid alternating movements and finger to nose testing  Gait:  Wide-based, slightly sensory ataxia  Walk on heels: yes, bilaterally.  Walk on toes: yes, bilaterally.    Getting up from seated position without pushing on chair: yes.  Posture: normal.  Romberg: Positive    The Neuropathy Impairment Scoring System (NIS) strength scale was utilized.    0=normal; -1=25% weak; -2=50% weak; -3=75% weak; -3.25=movement against gravity;   -3.5=movement, gravity eliminated;-3.75=minimal contraction; -4= paralysis.  Cranial nerves   R Muscle strength L  R  L   0 Frontalis 0   Visual acuity    0 Orbicularis oculi 0  3 mm Pupils 3 mm   0 Lower facial muscles 0  0 Light reflex 0   0 Temporal-[place]eter 0  0 Ptosis 0   0 Palate-Pharynx 0  0 Extraocular muscles 0   0 Sternocleidomastoid 0       0 Trapezius 0   Hearing    0 Genioglossus 0              R Muscle, strength L  R Muscle, strength L    Neck     Trunk    0 Neck flexors 0   Abdominal muscles    0 Neck extensors 0   Paraspinals     Upper Limbs    Lower Limbs    0 Supraspinatus 0  0 Iliopsoas 0   0 Infraspinatus 0  0 Adductors, thigh 0   0 Pectoralis 0  0 Gluteus medius 0   0 Deltoid 0  0 Gluteus max 0   0 Biceps brachii 0  0 Quadriceps 0   0 Brachioradialis 0  0 Hamstrings 0    Supinator/pronator   0 Tibialis anterior 0   0 Triceps 0  0 Peronei 0   0 Wrist extensor 0  0 EHL 0   0 Wrist flexors 0  0 Toe extensors 0   0 Digit extensors 0  0 Tibialis post. 0   0 Digit flexors 0  0 Toe flexors 0   0 Thenar 0  0 Calf muscles 0   0 Hypothenar 0       0 Interossei 0            R Reflexes L  R Sensation L   For NIS:  Normal=0 Reduced=1 Absent=2     0 Biceps brachii 0   Finger index    0 Brachioradialis 0  0 Cold 0   0 Triceps 0  -2 Pinprick -2   0 Mckenzie 0  0 Vibration 0   -4 Quadriceps -4  -1 Joint position -1   -4 Gastroc-soleus -4   Toes (Great)    0 Clonus (ankle) 0  -1 Cold -1   Flexor Plantar response Flexor  -3 Pinprick -3     -4 Vibration -4     -3 Joint position -3          ASSESSMENT / PLAN  #1 Chronic length-dependent sensory  neuropathy likely nutritional deficiency in the setting of heavy alcohol use and poor oral intake  #2 History of GBS  #3 Alcoholic cirrhosis with portal hypertension  #4 Vitamin B6 deficiency     Mr. Rucker's neurological exam and EMG findings are consistent with a length-dependent sensory neuropathy. The etiology of neuropathy is most likely nutritional deficiency in the setting of heavy alcohol use and poor oral intake (as evidenced by hypokalemia, weight loss, and low vitamins). Inflammatory process is much less likely at this point given chronic course over several years and stabilization with the improvement of oral intake. Immunosuppressive therapy is not indicated at this time.  Treatment at this point would be mainly supportive.  I am glad that he has stopped heavy drinking and his oral intake has improved.  He understands that there will likely be residual numbness in his hands and feet but the symptoms should not significantly progress as long as he maintains his nutritional status.    After discussion with Mr. Rucker, we have agreed to proceed with the following investigations and management:    Recommendations:  -vitamin B6 supplement 25 mg daily for 2 months.  He should have the level rechecked with his PCP after 2 months  -Continue vitamin B1 supplement  -Continue active lifestyle and regular exercise.  He has completed physical therapy and has learned how to exercise by himself.  -Foot care is important for peripheral neuropathy, especially when there is loss of sensation.  I recommend that feet be inspected daily for injuries, and moisturizing in order to prevent skin breakdown.    -He should not walk in the dark as his proprioceptive sense is impaired.  -He can continue to use gabapentin 400-400-800 for contact allodynia and hypersensitivity  -Follow-up in 6 months.    I spent a total of 75 minutes on the day of the visit for chart review, face-to-face visit, counseling/coordination of care, and  documentation. Please see the note for further information on patient assessment and treatment.       PATIENT EDUCATION  Ready to learn, no apparent learning barriers were identified; learning preferences include listening.  Explained diagnosis and treatment plan; patient expressed understanding of the content.

## 2024-11-26 DIAGNOSIS — G25.81 RESTLESS LEGS SYNDROME (RLS): ICD-10-CM

## 2024-11-26 RX ORDER — PRAMIPEXOLE DIHYDROCHLORIDE 0.25 MG/1
0.25 TABLET ORAL 2 TIMES DAILY
Qty: 60 TABLET | Refills: 0 | Status: SHIPPED | OUTPATIENT
Start: 2024-11-26

## 2024-11-26 NOTE — TELEPHONE ENCOUNTER
Received a refill request for pramipexole. Looks like he is being seen by Dr. Chou and I am not sure if the refill should go to you or her.    Med is T'd up for 1 month supply.      JERICHO Guevara on 11/26/2024 at 10:58 AM

## 2024-12-31 DIAGNOSIS — E53.1 VITAMIN B6 DEFICIENCY: Primary | ICD-10-CM

## 2024-12-31 DIAGNOSIS — G62.9 SENSORY NEUROPATHY: ICD-10-CM

## 2024-12-31 DIAGNOSIS — E53.1 VITAMIN B6 DEFICIENCY: ICD-10-CM

## 2024-12-31 RX ORDER — PYRIDOXINE HCL (VITAMIN B6) 25 MG
25 TABLET ORAL DAILY
Qty: 60 TABLET | Refills: 0 | Status: SHIPPED | OUTPATIENT
Start: 2024-12-31

## 2024-12-31 NOTE — TELEPHONE ENCOUNTER
Please instruct patient to go get lab draw to recheck vitamin B6 level. Lab order is placed. I will refill the prescription.

## 2025-06-08 ENCOUNTER — HEALTH MAINTENANCE LETTER (OUTPATIENT)
Age: 50
End: 2025-06-08